# Patient Record
Sex: FEMALE | Race: WHITE | NOT HISPANIC OR LATINO | URBAN - METROPOLITAN AREA
[De-identification: names, ages, dates, MRNs, and addresses within clinical notes are randomized per-mention and may not be internally consistent; named-entity substitution may affect disease eponyms.]

---

## 2023-09-12 ENCOUNTER — EMERGENCY (EMERGENCY)
Facility: HOSPITAL | Age: 43
LOS: 1 days | Discharge: HOME | End: 2023-09-12
Admitting: STUDENT IN AN ORGANIZED HEALTH CARE EDUCATION/TRAINING PROGRAM
Payer: COMMERCIAL

## 2023-09-12 VITALS
DIASTOLIC BLOOD PRESSURE: 66 MMHG | OXYGEN SATURATION: 100 % | HEART RATE: 59 BPM | SYSTOLIC BLOOD PRESSURE: 101 MMHG | TEMPERATURE: 98 F | WEIGHT: 119.93 LBS | RESPIRATION RATE: 18 BRPM
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
## 2023-09-12 DIAGNOSIS — S80.211A ABRASION, RIGHT KNEE, INITIAL ENCOUNTER: ICD-10-CM

## 2023-09-12 DIAGNOSIS — S01.01XA LACERATION WITHOUT FOREIGN BODY OF SCALP, INITIAL ENCOUNTER: ICD-10-CM

## 2023-09-12 DIAGNOSIS — Z88.0 ALLERGY STATUS TO PENICILLIN: ICD-10-CM
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
## 2023-09-12 DIAGNOSIS — Z23 ENCOUNTER FOR IMMUNIZATION: ICD-10-CM
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
## 2023-09-12 DIAGNOSIS — S80.212A ABRASION, LEFT KNEE, INITIAL ENCOUNTER: ICD-10-CM

## 2023-09-12 DIAGNOSIS — S50.02XA CONTUSION OF LEFT ELBOW, INITIAL ENCOUNTER: ICD-10-CM

## 2023-09-12 PROCEDURE — 73080 X-RAY EXAM OF ELBOW: CPT

## 2023-09-12 PROCEDURE — 99284 EMERGENCY DEPT VISIT MOD MDM: CPT | Mod: 25

## 2023-09-12 PROCEDURE — 71046 X-RAY EXAM CHEST 2 VIEWS: CPT | Mod: 26

## 2023-09-12 PROCEDURE — 70450 CT HEAD/BRAIN W/O DYE: CPT | Mod: 26,MG

## 2023-09-12 PROCEDURE — 73080 X-RAY EXAM OF ELBOW: CPT | Mod: 26,LT

## 2023-09-12 PROCEDURE — 71046 X-RAY EXAM CHEST 2 VIEWS: CPT

## 2023-09-12 PROCEDURE — 99285 EMERGENCY DEPT VISIT HI MDM: CPT | Mod: 25

## 2023-09-12 PROCEDURE — 12001 RPR S/N/AX/GEN/TRNK 2.5CM/<: CPT

## 2023-09-12 PROCEDURE — G1004: CPT

## 2023-09-12 PROCEDURE — 73502 X-RAY EXAM HIP UNI 2-3 VIEWS: CPT

## 2023-09-12 PROCEDURE — 72125 CT NECK SPINE W/O DYE: CPT | Mod: MG

## 2023-09-12 PROCEDURE — 70450 CT HEAD/BRAIN W/O DYE: CPT | Mod: MG

## 2023-09-12 PROCEDURE — 90715 TDAP VACCINE 7 YRS/> IM: CPT

## 2023-09-12 PROCEDURE — 90471 IMMUNIZATION ADMIN: CPT

## 2023-09-12 PROCEDURE — 73502 X-RAY EXAM HIP UNI 2-3 VIEWS: CPT | Mod: 26,LT

## 2023-09-12 PROCEDURE — 72125 CT NECK SPINE W/O DYE: CPT | Mod: 26,MG

## 2023-09-12 RX ORDER — TETANUS TOXOID, REDUCED DIPHTHERIA TOXOID AND ACELLULAR PERTUSSIS VACCINE, ADSORBED 5; 2.5; 8; 8; 2.5 [IU]/.5ML; [IU]/.5ML; UG/.5ML; UG/.5ML; UG/.5ML
0.5 SUSPENSION INTRAMUSCULAR ONCE
Refills: 0 | Status: COMPLETED | OUTPATIENT
Start: 2023-09-12 | End: 2023-09-12

## 2023-09-12 RX ADMIN — TETANUS TOXOID, REDUCED DIPHTHERIA TOXOID AND ACELLULAR PERTUSSIS VACCINE, ADSORBED 0.5 MILLILITER(S): 5; 2.5; 8; 8; 2.5 SUSPENSION INTRAMUSCULAR at 13:12
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
## 2023-09-12 NOTE — ED PROVIDER NOTE - PHYSICAL EXAMINATION
GEN: WD/WN, NAD  HEAD: 2-3 mm left frontotemporal lac with gaping edges. No periorbital ecchymosis or Gandhi's sign  NEURO: Alert, oriented. CN II-XII intact. Clear speech.  SILT all ext. Motor 5/5 all ext.  Gait steady.   EYE: PERRL, EOMI. No nystagmus.   ENT: Airway patent.  No dental injury. No epistaxis or rhinorrhea. No otorrhea or hemotympanum.  PULM: No resp distress. Lungs full/CTA bilat.  Point tenderness to anterolateral left lower ribs, no crepitus, no swelling/deformity.   CV: RRR, S1S2  GI: Abdomen soft, nontender  MSK: Neck with painless ROM; no midline neck tenderness.  Extremities: ecchymosis & tenderness left elbow, tenderness over left hip greater trochanter.  Full ROM of joints.  Minor abrasions to both knees without tenderness, swelling or decreased ROM.    SKIN:  Normal color and turgor.

## 2023-09-12 NOTE — ED PROVIDER NOTE - PATIENT PORTAL LINK FT
You can access the FollowMyHealth Patient Portal offered by [facility] by registering at the following website: [email or web address]. By joining [employer]’s FollowMyHealth portal, you will also be able to view your health information using other applications (apps) compatible with our system.

## 2023-09-12 NOTE — ED PROVIDER NOTE - NSFOLLOWUPINSTRUCTIONS_ED_ALL_ED_FT
Apply bacitracin to scalp laceration 3 times/days.  May start washing (showering) after 24-48 hours.  Do not submerge in water (ie do not put head under water in tub, swimming pool, etc).      Suture to be removed in 5-6 days.  See your doctor/urgent care, or return to ER for this.     Return to the Emergency Department if you have any new or worsening symptoms, or if you have any concerns.  ================  Care For Your Stitches    WHAT YOU NEED TO KNOW:    What are stitches? Stitches, or sutures, are used to close cuts and wounds on the skin. Stitches need to be removed after your wound has healed.      How do I care for my stitches?    Protect the stitches. You may need to cover your stitches with a bandage for 24 to 48 hours, or as directed. Do not bump or hit the suture area. This could open the wound. Do not trim or shorten the ends of your stitches. If they rub on your clothing, put a gauze bandage between the stitches and your clothes.    Clean the area as directed. Carefully wash your wound with soap and water. For mouth and lip wounds, rinse your mouth after meals and at bedtime. Ask your healthcare provider what to use to rinse your mouth. If you have a scalp wound, you may gently wash your hair every 2 days with mild shampoo. Do not use hair products, such as hair spray. Check your wound for signs of infection when you clean it. Signs include redness, swelling, and pus.    Keep the area dry as directed. Wait 12 to 24 hours after you receive your stitches before you take a shower. Take showers instead of baths. Do not take a bath or swim. Your healthcare provider will give you instructions for bathing with your stitches.  What can I do to help my wound heal?    Elevate your wound. Keep your wound above the level of your heart as often as you can. This will help decrease swelling and pain. Prop the area on pillows or blankets, if possible, to keep it elevated comfortably.    Limit activity. Do not stretch the skin around your wound. This will help prevent bleeding and swelling.  When should I seek immediate care?    Your stitches come apart.    Blood soaks through your bandages.    You suddenly cannot move your injured joint.    You have sudden numbness around your wound.    You see red streaks coming from your wound.  When should I contact my healthcare provider?    You have a fever and chills.    Your wound is red, warm, swollen, or leaking pus.    There is a bad smell coming from your wound.    You have increased pain in the wound area.    You have questions or concerns about your condition or care.  CARE AGREEMENT:    You have the right to help plan your care. Learn about your health condition and how it may be treated. Discuss treatment options with your healthcare providers to decide what care you want to receive. You always have the right to refuse treatment.  =================  Head Injury    WHAT YOU NEED TO KNOW:    What do I need to know about a head injury? A head injury can include your scalp, face, skull, or brain and range from mild to severe. Effects can appear immediately after the injury or develop later. The effects may last a short time or be permanent. Healthcare providers may want to check your recovery over time. Treatment may change as you recover or develop new health problems from the head injury.    What are the signs and symptoms of a head injury?    An open scalp or skin wound, swelling, or bruising    Mild to moderate headache    Dizziness or loss of balance    Nausea or vomiting    Ringing in the ears or neck pain    Confusion, especially right after the injury    Change in mood, such as feeling restless or irritable    Trouble thinking, remembering, or concentrating    Drowsiness or decreased amount of energy    Trouble sleeping  How is a head injury diagnosed?    Tell your healthcare provider about your injury and symptoms. The provider will do an exam to check your brain function. He or she will check how your pupils react to light. He or she will check your memory, hand grasp, and balance.    You may need x-rays, a CT scan, or an MRI to check for bleeding or major damage to your skull or brain. You may be given contrast liquid to help the pictures show up better. Tell the healthcare provider if you have ever had an allergic reaction to contrast liquid. Do not enter the MRI room with anything metal. Metal can cause serious injury. Tell the provider if you have any metal in or on your body.  How is a head injury treated? A mild head injury may not need to be treated. You may be given medicine to decrease pain. Other treatments may depend on how severe your head injury is. A concussion, hematoma (collection of blood), or traumatic brain injury may need both immediate and long-term treatment.    How can I manage my symptoms?    Rest or do quiet activities. Limit your time watching TV, using the computer, or doing tasks that require a lot of thinking. Slowly return to your normal activities as directed. Do not play sports or do activities that may cause you to get hit in the head. Ask your healthcare provider when you can return to sports.    Apply ice on your head for 15 to 20 minutes every hour or as directed. Use an ice pack, or put crushed ice in a plastic bag. Cover it with a towel before you apply it to your skin. Ice helps prevent tissue damage and decreases swelling and pain.    Have someone stay with you for 24 hours , or as directed. This person can monitor you for problems and call for help if needed. When you are awake, the person should ask you a few questions every few hours to see if you are thinking clearly. An example is to ask your name or address.  What can I do to prevent another head injury?    Wear a helmet that fits properly. Do this when you play sports, or ride a bike, scooter, or skateboard. Helmets help decrease your risk for a serious head injury. Talk to your healthcare provider about other ways you can protect yourself if you play sports.    Wear your seatbelt every time you are in a car. This helps lower your risk for a head injury if you are in a car accident.  Call your local emergency number (911 in the ), or have someone else call if:    You cannot be woken.    You have a seizure.    You stop responding to others or you faint.    You have blurry or double vision.    Your speech becomes slurred or confused.    You have arm or leg weakness, loss of feeling, or new problems with coordination.    Your pupils are larger than usual, or one pupil is a different size than the other.    You have blood or clear fluid coming out of your ears or nose.  When should I seek immediate care?    You have repeated or forceful vomiting.    You feel confused.    Your headache gets worse or becomes severe.    You or someone caring for you notices that you are harder to wake than usual.  When should I call my doctor?    Your symptoms last longer than 6 weeks after the injury.    You have questions or concerns about your condition or care.  CARE AGREEMENT:    You have the right to help plan your care. Learn about your health condition and how it may be treated. Discuss treatment options with your healthcare providers to decide what care you want to receive. You always have the right to refuse treatment.  ================  Elbow Contusion  An elbow contusion is a deep bruise of the elbow. Contusions are the result of a blunt injury to tissues and muscle fibers under the skin. The injury causes bleeding under the skin. The skin overlying the contusion may turn blue, purple, or yellow. Minor injuries will give you a painless contusion, but more severe contusions may stay painful and swollen for a few weeks.    What are the causes?  Common causes of this condition include:  A hard hit to the elbow.  An injury (trauma) to the elbow.  Direct force on the elbow, such as from a fall.  What increases the risk?  You are more likely to develop this condition if you:  Play sports or do other physical activities.  Use blood thinners.  What are the signs or symptoms?  Symptoms of this condition include:  Swelling of the elbow.  Pain and tenderness of the elbow.  Discoloration of the elbow. The area may have redness and then turn blue, purple, or yellow.  How is this diagnosed?  This condition is diagnosed based on:  Your symptoms and medical history.  A physical exam.  You may also need an X-ray to determine if there are any associated injuries, such as broken bones (fractures).    An MRI might be done if the swelling and pain do not go away in a few weeks.    How is this treated?  This condition may be treated with:  Rest, ice, pressure (compression), and elevation. This is often called RICE therapy. In general, this is considered the best treatment for this condition.  A sling or splint to support your injury.  Over-the-counter anti-inflammatory medicines, such as ibuprofen, for pain control.  Range-of-motion exercises.  Follow these instructions at home:  RICE therapy    Rest the injured area.  If directed, put ice on the injured area:  If you have a removable sling or splint, remove it as told by your health care provider.  Put ice in a plastic bag.  Place a towel between your skin and the bag.  Leave the ice on for 20 minutes, 2–3 times a day.  If directed, apply light compression to the injured area using an elastic bandage. Make sure the bandage is not wrapped too tightly. Remove and reapply the bandage as directed by your health care provider.  A person holding an ice pack on an injured elbow.Raise (elevate) the injured area above the level of your heart while you are sitting or lying down.  If you have a sling or splint:    A sling on a person's arm.  Wear the sling or splint as told by your health care provider. Remove it only as told by your health care provider.  Loosen the sling or splint if your fingers tingle, become numb, or turn cold and blue.  Keep the sling or splint clean.  If the sling or splint is not waterproof:  Do not let it get wet.  Cover it with a watertight covering when you take a bath or a shower.  General instructions    Take over-the-counter and prescription medicines only as told by your health care provider.  Return to your normal activities as told by your health care provider. Ask your health care provider what activities are safe for you.  Do range-of-motion exercises only as told by your health care provider.  Ask your health care provider when it is safe to drive if you have a sling or splint on your arm.  Wear elbow pads as told by your health care provider.  Keep all follow-up visits as told by your health care provider. This is important.  Contact a health care provider if:  Your symptoms do not improve after several days of treatment.  You have more redness, swelling, or pain in your elbow.  You have difficulty moving the injured area.  Your swelling or pain is not relieved with medicines.  Get help right away if:  Your skin over the contusion breaks and starts bleeding.  You have severe pain.  You have numbness in your hand or fingers.  Your hand or fingers turn pale or cold.  You have swelling of your hand and fingers.  You cannot move your fingers or wrist.  Summary  An elbow contusion is a deep bruise of the elbow.  Symptoms include pain, swelling, and discoloration of the elbow.  Rest the injured area and apply ice to the area as told by your health care provider.  If directed, apply light compression to the injured area using an elastic bandage.  Raise (elevate) the injured area above the level of your heart while you are sitting or lying down.  This information is not intended to replace advice given to you by your health care provider. Make sure you discuss any questions you have with your health care provider.

## 2023-09-12 NOTE — ED PROCEDURE NOTE - CPROC ED TOLERANCE1
Patient tolerated procedure well. O-T Plasty Text: The defect edges were debeveled with a #15 scalpel blade.  Given the location of the defect, shape of the defect and the proximity to free margins an O-T plasty was deemed most appropriate.  Using a sterile surgical marker, an appropriate O-T plasty was drawn incorporating the defect and placing the expected incisions within the relaxed skin tension lines where possible.    The area thus outlined was incised deep to adipose tissue with a #15 scalpel blade.  The skin margins were undermined to an appropriate distance in all directions utilizing iris scissors.
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
## 2023-09-12 NOTE — ED ADULT TRIAGE NOTE - OTHER COMPLAINTS
Patient denies loc or use of blood thinners. Abrasion noted to L side of head, L elbow and bilateral knees. Ambulatory in triage,